# Patient Record
Sex: MALE | Race: WHITE
[De-identification: names, ages, dates, MRNs, and addresses within clinical notes are randomized per-mention and may not be internally consistent; named-entity substitution may affect disease eponyms.]

---

## 2018-03-22 NOTE — PCM.PREANE
Preanesthetic Assessment





- Procedure


Proposed Procedure: 





Right Eye extraction with implant 





- Anesthesia/Transfusion/Family Hx


Anesthesia History: No Prior Anesthesia


Family History of Anesthesia Reaction: No





- Review of Systems


General: No Symptoms


Pulmonary: No Symptoms


Cardiovascular: No Symptoms


Gastrointestinal: No Symptoms


Neurological: No Symptoms


Other: Reports: None





- Physical Assessment


NPO Status Date: 03/21/18


NPO Status Time: 19:00


O2 Sat by Pulse Oximetry: 98


Respiratory Rate: 16


Vital Signs: 





 Last Vital Signs











Temp  36.4 C   03/22/18 09:50


 


Pulse  54 L  03/22/18 09:50


 


Resp  16   03/22/18 09:50


 


BP  129/71   03/22/18 09:50


 


Pulse Ox  98   03/22/18 09:50











Height: 1.83 m


Weight: 90.718 kg


ASA Class: 2


Mental Status: Alert & Oriented x3


Dentition: Reports: Dentures (upper )


Thyro-Mental Finger Breadths: 3


Mouth Opening Finger Breadths: 5


ROM/Head Extension: Full


Lungs: Clear to Auscultation, Normal Respiratory Effort


Cardiovascular: Regular Rate, Regular Rhythm





- Allergies


Allergies/Adverse Reactions: 


 Allergies











Allergy/AdvReac Type Severity Reaction Status Date / Time


 


No Known Allergies Allergy   Verified 03/21/18 15:01














- Blood


Blood Available: No





- Anesthesia Plan


Pre-Op Medication Ordered: None


Beta Blocker: Carvedilol


Med Last Dose Date: 03/22/18


Med Last Dose Time: 07:30





- Acknowledgements


Anesthesia Type Planned: MAC


Pt an Appropriate Candidate for the Planned Anesthesia: Yes


Alternatives and Risks of Anesthesia Discussed w Pt/Guardian: Yes


Pt/Guardian Understands and Agrees with Anesthesia Plan: Yes





PreAnesthesia Questionnaire





- Past Health History


Medical/Surgical History: Denies Medical/Surgical History





- SUBSTANCE USE


Recreational Drug Use History: No





- HOME MEDS


Home Medications: 


 Home Meds





Carvedilol [Carvedilol] 6.25 mg PO BID 03/21/18 [History]


Hydrochlorothiazide [Hydrochlorothiazide] 25 mg PO DAILY 03/21/18 [History]


Multivitamin [Zoo Chews] 1 tab PO DAILY 03/21/18 [History]


amLODIPine Besylate [Amlodipine Besylate] 10 mg PO DAILY 03/21/18 [History]











- CURRENT (IN HOUSE) MEDS


Current Meds: 





 Current Medications





Brimonidine Tartrate (Alphagan 0.2% Ophth Soln)  0 ml EYERT ASDIRECTED [facility]


   Stop: 03/22/18 18:00


   Last Admin: 03/22/18 10:07 Dose:  1 drop


Cefuroxime Sodium (Zinacef)  0 mg EYERT ASDIRECTED [facility]


   Stop: 03/22/18 18:00


Lidocaine HCl (Xylocaine-Mpf 1%)  10 ml INJECT ASDIRECTED [facility]


   Stop: 03/22/18 18:00


Phenylephrine HCl (Richie-Synephrine 2.5% Ophth Soln)  0 ml EYERT ASDIRECTED [facility]


   Stop: 03/22/18 18:00


   Last Admin: 03/22/18 10:16 Dose:  1 drop


Pilocarpine HCl (Pilocar 4% Ophth Soln)  0 ml EYERT ASDIRECTED [facility]


   Stop: 03/22/18 18:00


Polymyxin/Trimethoprim Sulfate (Polytrim Ophth Soln)  0 ml EYERT ASDIRECTED [facility]


   Stop: 03/22/18 18:00


   Last Admin: 03/22/18 10:01 Dose:  1 drop


Tetracaine HCl (Tetracaine 0.5% Steri-Unit Sol)  0 ml EYERT ASDIRECTED [facility]


   Stop: 03/22/18 18:00


Tropicamide (Mydriacyl 1% Ophth Soln)  0 ml EYERT ASDIRECTED [facility]


   Stop: 03/22/18 18:00


   Last Admin: 03/22/18 10:22 Dose:  1 drop

## 2018-03-22 NOTE — [id number]
Post Anesthesia Note





- EVALUATION WITHIN 48HRS OF ANESTHETIC


Vital Signs in Normal Range: Yes


Patient Participated in Evaluation: Yes


Respiratory Function Stable: Yes


Airway Patent: Yes


Cardiovascular Function Stable: Yes


Hydration Status Stable: Yes


Pain Control Satisfactory: Yes


Nausea and Vomiting Control Satisfactory: Yes


Mental Status Recovered: Yes


Pulse Rate: 64


SaO2: 100


Resp Rate: 16


Temperature: 36.9 C


Blood Pressure: 131/86

## 2018-05-24 NOTE — PCM.PREANE
Preanesthetic Assessment





- Anesthesia/Transfusion/Family Hx


Anesthesia History: No Prior Anesthesia


Family History of Anesthesia Reaction: No


Transfusion History: No Prior Transfusion(s)





- Review of Systems


General: No Symptoms


Pulmonary: No Symptoms


Cardiovascular: No Symptoms


Gastrointestinal: No Symptoms


Neurological: No Symptoms


Other: Reports: None





- Physical Assessment


NPO Status Date: 05/23/18


NPO Status Time: 19:00


Pulse: 61


O2 Sat by Pulse Oximetry: 98


Respiratory Rate: 16


Blood Pressure: 124/75


Temperature: 36.6 C


Vital Signs: 





 Last Vital Signs











Temp  36.6 C   05/24/18 08:50


 


Pulse  61   05/24/18 08:50


 


Resp  16   05/24/18 08:50


 


BP  124/75   05/24/18 08:50


 


Pulse Ox  98   05/24/18 08:50











Height: 1.83 m


Weight: 90.718 kg


ASA Class: 2


Mental Status: Alert & Oriented x3


Airway Class: Mallampati = 1


Dentition: Reports: Dentures (upper), Caries


Thyro-Mental Finger Breadths: 3


Mouth Opening Finger Breadths: 3


ROM/Head Extension: Full


Lungs: Clear to Auscultation, Normal Respiratory Effort


Cardiovascular: Regular Rate, Regular Rhythm





- Allergies


Allergies/Adverse Reactions: 


 Allergies











Allergy/AdvReac Type Severity Reaction Status Date / Time


 


No Known Allergies Allergy   Verified 03/21/18 15:01














- Blood


Blood Available: No


Product(s) Available: None





- Anesthesia Plan


Pre-Op Medication Ordered: None





- Acknowledgements


Anesthesia Type Planned: MAC


Pt an Appropriate Candidate for the Planned Anesthesia: Yes


Alternatives and Risks of Anesthesia Discussed w Pt/Guardian: Yes


Pt/Guardian Understands and Agrees with Anesthesia Plan: Yes





PreAnesthesia Questionnaire





- Past Health History


Medical/Surgical History: Denies Medical/Surgical History





- SUBSTANCE USE


Smoking Status *Q: Former Smoker


Tobacco Use Within Last Twelve Months: No


Second Hand Smoke Exposure: No


Days Per Week of Alcohol Use: 1


Number of Drinks Per Day: 1


Total Drinks Per Week: 1


Recreational Drug Use History: No





- HOME MEDS


Home Medications: 


 Home Meds





Carvedilol 6.25 mg PO BID 03/21/18 [History]


Hydrochlorothiazide 25 mg PO DAILY 03/21/18 [History]


Multivitamin [Zoo Chews] 1 tab PO DAILY 03/21/18 [History]


amLODIPine Besylate [Amlodipine Besylate] 10 mg PO DAILY 03/21/18 [History]











- CURRENT (IN HOUSE) MEDS


Current Meds: 





 Current Medications





Brimonidine Tartrate (Alphagan 0.2% Ophth Soln)  0 ml EYELF ASDIRECTED [facility]


   Stop: 05/24/18 18:00


   Last Admin: 05/24/18 09:03 Dose:  1 drop


Cefuroxime Sodium (Zinacef)  0 mg EYELF ASDIRECTED [facility]


   Stop: 05/24/18 18:00


Lidocaine HCl (Xylocaine-Mpf 1%)  0 ml INJECT ASDIRECTED [facility]


   Stop: 05/24/18 18:00


Phenylephrine HCl (Richie-Synephrine 2.5% Ophth Soln)  0 ml EYELF ASDIRECTED [facility]


   Stop: 05/24/18 18:00


   Last Admin: 05/24/18 09:08 Dose:  1 drop


Pilocarpine HCl (Pilocar 4% Ophth Soln)  0 ml EYELF ASDIRECTED [facility]


   Stop: 05/24/18 18:00


Polymyxin/Trimethoprim Sulfate (Polytrim Ophth Soln)  0 ml EYELF ASDIRECTED [facility]


   Stop: 05/24/18 18:00


   Last Admin: 05/24/18 08:57 Dose:  1 drop


Tetracaine HCl (Tetracaine 0.5% Steri-Unit Sol)  0 ml EYELF ASDIRECTED [facility]


   Stop: 05/24/18 18:00


Tropicamide (Mydriacyl 1% Ophth Soln)  0 ml EYELF ASDIRECTED [facility]


   Stop: 05/24/18 18:00

## 2018-05-24 NOTE — [id number]
Post Anesthesia Note





- EVALUATION WITHIN 48HRS OF ANESTHETIC


Vital Signs in Normal Range: Yes


Patient Participated in Evaluation: Yes


Respiratory Function Stable: Yes


Airway Patent: Yes


Cardiovascular Function Stable: Yes


Hydration Status Stable: Yes


Pain Control Satisfactory: Yes


Nausea and Vomiting Control Satisfactory: Yes


Mental Status Recovered: Yes


Pulse Rate: 61


Resp Rate: 16


Temperature: 36.6 C


Blood Pressure: 124/75

## 2021-12-24 ENCOUNTER — HOSPITAL ENCOUNTER (EMERGENCY)
Dept: HOSPITAL 41 - JD.ED | Age: 66
Discharge: HOME | End: 2021-12-24
Payer: COMMERCIAL

## 2021-12-24 DIAGNOSIS — N39.0: Primary | ICD-10-CM

## 2021-12-24 DIAGNOSIS — R33.9: ICD-10-CM

## 2021-12-24 DIAGNOSIS — I10: ICD-10-CM

## 2021-12-24 PROCEDURE — 51701 INSERT BLADDER CATHETER: CPT

## 2021-12-24 PROCEDURE — 99284 EMERGENCY DEPT VISIT MOD MDM: CPT

## 2021-12-24 PROCEDURE — 87086 URINE CULTURE/COLONY COUNT: CPT

## 2021-12-24 PROCEDURE — 81001 URINALYSIS AUTO W/SCOPE: CPT

## 2023-01-19 ENCOUNTER — HOSPITAL ENCOUNTER (OUTPATIENT)
Dept: HOSPITAL 41 - JD.SDS | Age: 68
End: 2023-01-19
Attending: OPHTHALMOLOGY
Payer: COMMERCIAL

## 2023-01-19 DIAGNOSIS — Z98.890: ICD-10-CM

## 2023-01-19 DIAGNOSIS — H26.493: Primary | ICD-10-CM

## 2023-01-19 DIAGNOSIS — I10: ICD-10-CM

## 2023-01-19 DIAGNOSIS — Z87.891: ICD-10-CM

## 2023-01-19 DIAGNOSIS — Z96.1: ICD-10-CM

## 2023-03-07 NOTE — EDM.PDOC
ED HPI GENERAL MEDICAL PROBLEM





- General


Chief Complaint: Genitourinary Problem


Stated Complaint: UNABLE TO URINATE


Time Seen by Provider: 12/24/21 18:43


Source of Information: Reports: Patient, RN Notes Reviewed


History Limitations: Reports: No Limitations





- History of Present Illness


INITIAL COMMENTS - FREE TEXT/NARRATIVE: 





Patient is a 66-year-old male who presents to the ER for evaluation of acute 

urinary retention.  States that he developed issues with urgency earlier today, 

and feels like he is not emptying his bladder completely.  States he was able to

go to the bathroom a little bit, but only dribbled.  He is not had issues with 

this in the past.  Not complaining of any dysuria.  Patient states that when he 

does try to go to the bathroom now, he is not able to get anything out except a 

few dribbles.  Initial bladder scan at the time of triage demonstrated about 120

mils of urine in his bladder.  Patient denies any other sick-like symptoms, 

fever/chills, cough/shortness of breath, nausea/vomiting/diarrhea.








- Related Data


                                    Allergies











Allergy/AdvReac Type Severity Reaction Status Date / Time


 


No Known Allergies Allergy   Verified 12/24/21 18:31











Home Meds: 


                                    Home Meds





Multivitamin [Zoo Chews] 1 tab PO DAILY 03/21/18 [History]


amLODIPine Besylate [Amlodipine Besylate] 10 mg PO DAILY 03/21/18 [History]


carvediloL [Carvedilol] 6.25 mg PO BID 03/21/18 [History]


hydroCHLOROthiazide [Hydrochlorothiazide] 25 mg PO DAILY 03/21/18 [History]











Past Medical History





- Past Health History


Medical/Surgical History: Denies Medical/Surgical History


Cardiovascular History: Reports: Hypertension





Social & Family History





- Tobacco Use


Tobacco Use Status *Q: Never Tobacco User


Second Hand Smoke Exposure: No





- Caffeine Use


Caffeine Use: Reports: Coffee, Soda





- Recreational Drug Use


Recreational Drug Use: No





ED ROS GENERAL





- Review of Systems


Review Of Systems: Comprehensive ROS is negative, except as noted in HPI.





ED EXAM, RENAL/





- Physical Exam


Exam: See Below


Exam Limited By: No Limitations


General Appearance: Alert, WD/WN, No Apparent Distress


Respiratory/Chest: No Respiratory Distress, Lungs Clear, Normal Breath Sounds, 

No Accessory Muscle Use, Chest Non-Tender


Cardiovascular: Normal Peripheral Pulses, Regular Rate, Rhythm, No Edema


GI/Abdominal: Normal Bowel Sounds, Soft, Non-Tender, No Distention, No Mass


Extremities: Normal Inspection, Normal Capillary Refill


Neurological: Alert, Oriented, Normal Cognition, No Motor/Sensory Deficits


Psychiatric: Normal Affect, Normal Mood


Skin Exam: Warm, Dry, Intact, Normal Color, No Rash





Course





- Vital Signs


Last Recorded V/S: 


                                Last Vital Signs











Temp  98.0 F   12/24/21 18:30


 


Pulse  84   12/24/21 18:30


 


Resp  18   12/24/21 18:30


 


BP  158/94 H  12/24/21 18:30


 


Pulse Ox  100   12/24/21 18:30














- Orders/Labs/Meds


Orders: 


                               Active Orders 24 hr











 Category Date Time Status


 


 Bladder Scan [RC] ASDIRECTED Care  12/24/21 18:42 Ordered


 


 UA W/MICROSCOPIC [URIN] Stat Lab  12/24/21 18:43 Ordered











Labs: 


                                Laboratory Tests











  12/24/21 Range/Units





  19:05 


 


Urine Color  Light yellow  (Yellow)  


 


Urine Appearance  Slt cloudy H  (Clear)  


 


Urine pH  6.5  (5.0-8.0)  


 


Ur Specific Gravity  1.010  (1.005-1.030)  


 


Urine Protein  2+ H  (Negative)  


 


Urine Glucose (UA)  Negative  (Negative)  


 


Urine Ketones  Negative  (Negative)  


 


Urine Occult Blood  2+ H  (Negative)  


 


Urine Nitrite  Negative  (Negative)  


 


Urine Bilirubin  Negative  (Negative)  


 


Urine Urobilinogen  0.2  (0.2-1.0)  


 


Ur Leukocyte Esterase  3+ H  (Negative)  











Meds: 


Medications














Discontinued Medications














Generic Name Dose Route Start Last Admin





  Trade Name Freq  PRN Reason Stop Dose Admin


 


Levofloxacin  500 mg  12/24/21 19:51 





  Levofloxacin 500 Mg Tab  PO  12/24/21 19:52 





  ONETIME ONE  


 


Lidocaine HCl  10 ml  12/24/21 18:48 





  Lidocaine 2% Jelly 10 Ml Urojet  MUCMEM  12/24/21 18:49 





  ONETIME ONE  














- Re-Assessments/Exams


Free Text/Narrative Re-Assessment/Exam: 





12/24/21 18:51


Patient presents to the ER for evaluation of his acute urinary retention.  We 

will go ahead and do a quick cath and check urinalysis.  I did go over options 

with the patient regarding possible Roche placement and he would rather not have

 a Roche placed at this time if at all possible.





12/24/21 19:31


Made aware by nursing staff, that they did rescan him on the bladder scan, and 

were getting readings of 250 mL in his bladder.  After the straight cath nursing

 states that they did relieve just over 400 mils of urine from his bladder.  

Patient states he is feeling much better.  We will await urinalysis results, and

 then figure out disposition.





12/24/21 19:52


Patient's urinalysis demonstrates 3+ leukocyte Estrace.  Nitrite negative.   Due

 to this being a cath urine specimen we will treat him for acute UTI.  First 

dose of oral Levaquin was given in the ER and patient be given a prescription 

for ciprofloxacin through the [employer] [facility] [facility].  Patient will be urged to return to the

 ER by morning if he has not voided successfully.





Departure





- Departure


Time of Disposition: 19:53


Disposition: Home, Self-Care 01


Condition: Good


Clinical Impression: 


 UTI, Urinary tract infectious disease, Retention of urine








- Discharge Information


*PRESCRIPTION DRUG MONITORING PROGRAM REVIEWED*: No


*COPY OF PRESCRIPTION DRUG MONITORING REPORT IN PATIENT SERENE: No


Instructions:  Acute Urinary Retention, Male, Easy-to-Read, Urinary Tract 

Infection, Adult, Easy-to-Read


Referrals: 


PCP,None [Primary Care Provider] - 


Forms:  ED Department Discharge


Additional Instructions: 


You have been evaluated in the ED for your urinary symptoms.





Your urinalysis was consistent with an acute urinary tract infection. Your urine

was sent for culture, and you will be notified if you should need a change in 

your antibiotic. This may take up to 48 hours to result.





You were given your first dose of oral antibiotics in the ER, Levaquin.  You 

will need to continue oral ciprofloxacin 2 times a day for the next 7 days.  

This medication was prescribed to you through the [facility] in our ER 

lobby due to it being a holiday weekend.





If you still cannot void by tomorrow morning, or at max time by noon tomorrow, 

you will need to return to the ER for possible Roche catheter placement.





Please increase your oral fluid intake and try to stay adequately hydrated.





Please return to the ED if your symptoms change or worsen.





Sepsis Event Note (ED)





- Focused Exam


Vital Signs: 


                                   Vital Signs











  Temp Pulse Resp BP Pulse Ox


 


 12/24/21 18:30  98.0 F  84  18  158/94 H  100














- My Orders


Last 24 Hours: 


My Active Orders





12/24/21 18:42


Bladder Scan [RC] ASDIRECTED 





12/24/21 18:43


UA W/MICROSCOPIC [URIN] Stat 














- Assessment/Plan


Last 24 Hours: 


My Active Orders





12/24/21 18:42


Bladder Scan [RC] ASDIRECTED 





12/24/21 18:43


UA W/MICROSCOPIC [URIN] Stat Additional Notes: Patient consent was obtained to proceed with the visit and recommended plan of care after discussion of all risks and benefits, including the risks of COVID-19 exposure. Detail Level: Simple